# Patient Record
Sex: FEMALE | Race: WHITE | NOT HISPANIC OR LATINO | RURAL
[De-identification: names, ages, dates, MRNs, and addresses within clinical notes are randomized per-mention and may not be internally consistent; named-entity substitution may affect disease eponyms.]

---

## 2022-05-12 NOTE — PATIENT DISCUSSION
Baseline testing. Patient's rheumatologist considering starting patient on Plaquenil. Visual field and macular OCT are within normal limits.

## 2022-10-06 ENCOUNTER — NEW PATIENT (OUTPATIENT)
Dept: RURAL CLINIC 2 | Facility: CLINIC | Age: 72
End: 2022-10-06

## 2022-10-06 DIAGNOSIS — H43.812: ICD-10-CM

## 2022-10-06 DIAGNOSIS — D31.31: ICD-10-CM

## 2022-10-06 DIAGNOSIS — H25.813: ICD-10-CM

## 2022-10-06 PROCEDURE — 99204 OFFICE O/P NEW MOD 45 MIN: CPT

## 2022-10-06 PROCEDURE — 92134 CPTRZ OPH DX IMG PST SGM RTA: CPT

## 2022-10-06 ASSESSMENT — TONOMETRY
OS_IOP_MMHG: 16
OD_IOP_MMHG: 16

## 2022-10-06 ASSESSMENT — VISUAL ACUITY
OD_CC: 20/25
OS_CC: 20/25

## 2022-11-17 ENCOUNTER — ESTABLISHED PATIENT (OUTPATIENT)
Dept: RURAL CLINIC 2 | Facility: CLINIC | Age: 72
End: 2022-11-17

## 2022-11-17 DIAGNOSIS — H16.223: ICD-10-CM

## 2022-11-17 DIAGNOSIS — H25.813: ICD-10-CM

## 2022-11-17 DIAGNOSIS — H43.812: ICD-10-CM

## 2022-11-17 DIAGNOSIS — D31.31: ICD-10-CM

## 2022-11-17 PROCEDURE — 99214 OFFICE O/P EST MOD 30 MIN: CPT

## 2022-11-17 PROCEDURE — 68761 CLOSE TEAR DUCT OPENING: CPT

## 2022-11-17 ASSESSMENT — TONOMETRY
OD_IOP_MMHG: 16
OS_IOP_MMHG: 16

## 2022-11-17 ASSESSMENT — VISUAL ACUITY
OS_CC: 20/25
OD_CC: 20/25

## 2023-03-24 NOTE — PATIENT DISCUSSION
"""Discussed with patient that the redness that she has after taking a shower is mostly like due to "" [id number]

## 2023-04-14 ENCOUNTER — EMERGENCY VISIT (OUTPATIENT)
Dept: RURAL CLINIC 2 | Facility: CLINIC | Age: 73
End: 2023-04-14

## 2023-04-14 DIAGNOSIS — H16.142: ICD-10-CM

## 2023-04-14 DIAGNOSIS — T15.12XA: ICD-10-CM

## 2023-04-14 DIAGNOSIS — D31.31: ICD-10-CM

## 2023-04-14 DIAGNOSIS — H16.223: ICD-10-CM

## 2023-04-14 DIAGNOSIS — H25.813: ICD-10-CM

## 2023-04-14 DIAGNOSIS — H43.812: ICD-10-CM

## 2023-04-14 PROCEDURE — 99214 OFFICE O/P EST MOD 30 MIN: CPT

## 2023-04-14 ASSESSMENT — VISUAL ACUITY
OS_CC: 20/20
OD_CC: 20/20

## 2023-06-13 ENCOUNTER — ESTABLISHED PATIENT (OUTPATIENT)
Dept: RURAL CLINIC 2 | Facility: CLINIC | Age: 73
End: 2023-06-13

## 2023-06-13 DIAGNOSIS — H16.223: ICD-10-CM

## 2023-06-13 PROCEDURE — 68761 CLOSE TEAR DUCT OPENING: CPT

## 2023-06-13 ASSESSMENT — TONOMETRY
OD_IOP_MMHG: 20
OS_IOP_MMHG: 20

## 2023-06-13 ASSESSMENT — VISUAL ACUITY
OS_CC: 20/25
OD_CC: 20/30+

## 2023-12-13 ENCOUNTER — ESTABLISHED PATIENT (OUTPATIENT)
Dept: RURAL CLINIC 2 | Facility: CLINIC | Age: 73
End: 2023-12-13

## 2023-12-13 DIAGNOSIS — D31.31: ICD-10-CM

## 2023-12-13 DIAGNOSIS — H52.223: ICD-10-CM

## 2023-12-13 DIAGNOSIS — H52.4: ICD-10-CM

## 2023-12-13 DIAGNOSIS — H16.223: ICD-10-CM

## 2023-12-13 DIAGNOSIS — H43.813: ICD-10-CM

## 2023-12-13 DIAGNOSIS — H25.813: ICD-10-CM

## 2023-12-13 DIAGNOSIS — H52.03: ICD-10-CM

## 2023-12-13 PROCEDURE — 92134 CPTRZ OPH DX IMG PST SGM RTA: CPT

## 2023-12-13 PROCEDURE — 92015 DETERMINE REFRACTIVE STATE: CPT

## 2023-12-13 PROCEDURE — 99214 OFFICE O/P EST MOD 30 MIN: CPT

## 2023-12-13 ASSESSMENT — TONOMETRY: OD_IOP_MMHG: 16

## 2023-12-13 ASSESSMENT — VISUAL ACUITY
OD_CC: 20/30+1
OS_CC: 20/25-2

## 2023-12-20 ENCOUNTER — FOLLOW UP (OUTPATIENT)
Dept: RURAL CLINIC 2 | Facility: CLINIC | Age: 73
End: 2023-12-20

## 2023-12-20 DIAGNOSIS — H16.223: ICD-10-CM

## 2023-12-20 PROCEDURE — 68761 CLOSE TEAR DUCT OPENING: CPT

## 2023-12-20 ASSESSMENT — VISUAL ACUITY
OS_CC: 20/30
OD_CC: 20/30-1

## 2023-12-20 ASSESSMENT — TONOMETRY
OD_IOP_MMHG: 15
OS_IOP_MMHG: 15

## 2024-01-10 ENCOUNTER — CONSULTATION/EVALUATION (OUTPATIENT)
Dept: RURAL CLINIC 1 | Facility: CLINIC | Age: 74
End: 2024-01-10

## 2024-01-10 DIAGNOSIS — H25.813: ICD-10-CM

## 2024-01-10 PROCEDURE — 92025 CPTRIZED CORNEAL TOPOGRAPHY: CPT

## 2024-01-10 PROCEDURE — 92134 CPTRZ OPH DX IMG PST SGM RTA: CPT

## 2024-01-10 PROCEDURE — 99214 OFFICE O/P EST MOD 30 MIN: CPT

## 2024-01-10 PROCEDURE — 92136 OPHTHALMIC BIOMETRY: CPT

## 2024-01-10 ASSESSMENT — VISUAL ACUITY
OD_BAT: 20/400
OS_PH: 20/25
OD_SC: 20/200
OU_CC: 20/25-1
OS_AM: 20/25
OU_SC: 20/200
OD_PH: 20/25
OU_CC: 20/25
OS_CC: 20/25
OS_SC: 20/200
OS_CC: 20/30
OD_PAM: 20/25
OD_CC: 20/30-1
OD_CC: 20/25

## 2024-01-10 ASSESSMENT — TONOMETRY
OS_IOP_MMHG: 18
OD_IOP_MMHG: 18

## 2024-01-26 ENCOUNTER — PRE-OP/H&P (OUTPATIENT)
Dept: RURAL CLINIC 1 | Facility: CLINIC | Age: 74
End: 2024-01-26

## 2024-01-26 VITALS
HEIGHT: 65 IN | HEART RATE: 84 BPM | DIASTOLIC BLOOD PRESSURE: 74 MMHG | WEIGHT: 145 LBS | SYSTOLIC BLOOD PRESSURE: 112 MMHG | BODY MASS INDEX: 24.16 KG/M2

## 2024-01-26 DIAGNOSIS — H91.90: ICD-10-CM

## 2024-01-26 DIAGNOSIS — I48.91: ICD-10-CM

## 2024-01-26 DIAGNOSIS — E78.2: ICD-10-CM

## 2024-01-26 DIAGNOSIS — F32.9: ICD-10-CM

## 2024-01-26 DIAGNOSIS — F41.9: ICD-10-CM

## 2024-01-26 DIAGNOSIS — Z01.818: ICD-10-CM

## 2024-01-26 DIAGNOSIS — I63.9: ICD-10-CM

## 2024-01-26 PROCEDURE — 99213 OFFICE O/P EST LOW 20 MIN: CPT

## 2024-02-05 ENCOUNTER — SURGERY/PROCEDURE (OUTPATIENT)
Dept: URBAN - METROPOLITAN AREA SURGERY 3 | Facility: SURGERY | Age: 74
End: 2024-02-05

## 2024-02-05 ENCOUNTER — POST-OP (OUTPATIENT)
Dept: RURAL CLINIC 1 | Facility: CLINIC | Age: 74
End: 2024-02-05

## 2024-02-05 DIAGNOSIS — H25.812: ICD-10-CM

## 2024-02-05 DIAGNOSIS — H25.811: ICD-10-CM

## 2024-02-05 DIAGNOSIS — Z96.1: ICD-10-CM

## 2024-02-05 PROCEDURE — 99024 POSTOP FOLLOW-UP VISIT: CPT

## 2024-02-05 PROCEDURE — V2787LX TORIC LENSX

## 2024-02-05 PROCEDURE — 66984CV REMOVE CATARACT, INSERT LENS, CUSTOM VISION

## 2024-02-05 PROCEDURE — 99199PCV PROF CUSTOM VISION PACKAGE

## 2024-02-05 PROCEDURE — 68841 INSJ RX ELUT IMPLT LAC CANAL: CPT

## 2024-02-05 ASSESSMENT — TONOMETRY: OS_IOP_MMHG: 21

## 2024-02-05 ASSESSMENT — VISUAL ACUITY: OS_SC: 20/50

## 2024-02-12 ENCOUNTER — POST OP/EVAL OF SECOND EYE (OUTPATIENT)
Dept: RURAL CLINIC 1 | Facility: CLINIC | Age: 74
End: 2024-02-12

## 2024-02-12 DIAGNOSIS — H25.811: ICD-10-CM

## 2024-02-12 PROCEDURE — 99213 OFFICE O/P EST LOW 20 MIN: CPT

## 2024-02-12 ASSESSMENT — VISUAL ACUITY
OD_CC: 20/25
OS_SC: 20/100
OD_PH: 20/25
OS_SC: 20/25
OD_BAT: 20/400
OD_SC: 20/200
OD_PAM: 20/25
OD_CC: 20/30-1

## 2024-02-12 ASSESSMENT — TONOMETRY
OS_IOP_MMHG: 18
OD_IOP_MMHG: 18

## 2024-02-19 ENCOUNTER — POST-OP (OUTPATIENT)
Dept: RURAL CLINIC 1 | Facility: CLINIC | Age: 74
End: 2024-02-19

## 2024-02-19 ENCOUNTER — SURGERY/PROCEDURE (OUTPATIENT)
Dept: URBAN - METROPOLITAN AREA SURGERY 3 | Facility: SURGERY | Age: 74
End: 2024-02-19

## 2024-02-19 DIAGNOSIS — H25.811: ICD-10-CM

## 2024-02-19 DIAGNOSIS — Z96.1: ICD-10-CM

## 2024-02-19 PROCEDURE — 68841 INSJ RX ELUT IMPLT LAC CANAL: CPT

## 2024-02-19 PROCEDURE — 66984CV REMOVE CATARACT, INSERT LENS, CUSTOM VISION

## 2024-02-19 PROCEDURE — V2787LX TORIC LENSX

## 2024-02-19 PROCEDURE — 99024 POSTOP FOLLOW-UP VISIT: CPT

## 2024-02-19 ASSESSMENT — TONOMETRY: OD_IOP_MMHG: 20

## 2024-02-19 ASSESSMENT — VISUAL ACUITY: OD_SC: 20/80

## 2024-02-26 ENCOUNTER — POST-OP (OUTPATIENT)
Dept: RURAL CLINIC 2 | Facility: CLINIC | Age: 74
End: 2024-02-26

## 2024-02-26 DIAGNOSIS — Z96.1: ICD-10-CM

## 2024-02-26 PROCEDURE — 99024 POSTOP FOLLOW-UP VISIT: CPT

## 2024-02-26 ASSESSMENT — VISUAL ACUITY
OS_SC: 20/20
OD_SC: 20/20

## 2024-02-26 ASSESSMENT — TONOMETRY
OS_IOP_MMHG: 17
OD_IOP_MMHG: 17

## 2024-03-12 ENCOUNTER — POST-OP (OUTPATIENT)
Dept: RURAL CLINIC 2 | Facility: CLINIC | Age: 74
End: 2024-03-12

## 2024-03-12 DIAGNOSIS — Z96.1: ICD-10-CM

## 2024-03-12 PROCEDURE — 99024 POSTOP FOLLOW-UP VISIT: CPT

## 2024-03-12 ASSESSMENT — TONOMETRY
OS_IOP_MMHG: 21
OD_IOP_MMHG: 21

## 2024-03-12 ASSESSMENT — VISUAL ACUITY
OD_SC: 20/20
OS_SC: 20/20

## 2024-04-19 ENCOUNTER — EMERGENCY VISIT (OUTPATIENT)
Dept: RURAL CLINIC 2 | Facility: CLINIC | Age: 74
End: 2024-04-19

## 2024-04-19 DIAGNOSIS — H16.223: ICD-10-CM

## 2024-04-19 DIAGNOSIS — H10.12: ICD-10-CM

## 2024-04-19 PROCEDURE — 99213 OFFICE O/P EST LOW 20 MIN: CPT | Mod: 24

## 2024-04-19 ASSESSMENT — VISUAL ACUITY
OD_SC: 20/20
OS_SC: 20/20-1

## 2024-09-10 ENCOUNTER — COMPREHENSIVE EXAM (OUTPATIENT)
Dept: RURAL CLINIC 2 | Facility: CLINIC | Age: 74
End: 2024-09-10

## 2024-09-10 DIAGNOSIS — H26.493: ICD-10-CM

## 2024-09-10 DIAGNOSIS — Z96.1: ICD-10-CM

## 2024-09-10 DIAGNOSIS — H16.223: ICD-10-CM

## 2024-09-10 DIAGNOSIS — H43.813: ICD-10-CM

## 2024-09-10 DIAGNOSIS — D31.31: ICD-10-CM

## 2024-09-10 PROCEDURE — 92134 CPTRZ OPH DX IMG PST SGM RTA: CPT

## 2024-09-10 PROCEDURE — 99214 OFFICE O/P EST MOD 30 MIN: CPT

## 2024-11-13 ENCOUNTER — FOLLOW UP (OUTPATIENT)
Dept: RURAL CLINIC 2 | Facility: CLINIC | Age: 74
End: 2024-11-13

## 2024-11-13 DIAGNOSIS — H16.223: ICD-10-CM

## 2024-11-13 PROCEDURE — 68761 CLOSE TEAR DUCT OPENING: CPT
